# Patient Record
Sex: MALE
[De-identification: names, ages, dates, MRNs, and addresses within clinical notes are randomized per-mention and may not be internally consistent; named-entity substitution may affect disease eponyms.]

---

## 2017-04-30 ENCOUNTER — HOSPITAL (OUTPATIENT)
Dept: OTHER | Age: 52
End: 2017-04-30
Attending: INTERNAL MEDICINE

## 2022-03-18 ENCOUNTER — OFFICE VISIT (OUTPATIENT)
Dept: SURGERY | Facility: CLINIC | Age: 57
End: 2022-03-18
Payer: MEDICAID

## 2022-03-18 VITALS
BODY MASS INDEX: 30.05 KG/M2 | WEIGHT: 176 LBS | SYSTOLIC BLOOD PRESSURE: 120 MMHG | DIASTOLIC BLOOD PRESSURE: 75 MMHG | HEART RATE: 71 BPM | HEIGHT: 64 IN

## 2022-03-18 DIAGNOSIS — Z12.5 PROSTATE CANCER SCREENING: ICD-10-CM

## 2022-03-18 DIAGNOSIS — N48.6 PEYRONIE'S DISEASE: Primary | ICD-10-CM

## 2022-03-18 DIAGNOSIS — R35.1 NOCTURIA: ICD-10-CM

## 2022-03-18 PROCEDURE — 99244 OFF/OP CNSLTJ NEW/EST MOD 40: CPT | Performed by: UROLOGY

## 2022-03-18 PROCEDURE — 3078F DIAST BP <80 MM HG: CPT | Performed by: UROLOGY

## 2022-03-18 PROCEDURE — 3008F BODY MASS INDEX DOCD: CPT | Performed by: UROLOGY

## 2022-03-18 PROCEDURE — 3074F SYST BP LT 130 MM HG: CPT | Performed by: UROLOGY

## 2022-03-18 RX ORDER — GABAPENTIN 300 MG/1
CAPSULE ORAL
COMMUNITY

## 2022-03-18 RX ORDER — TRAMADOL HYDROCHLORIDE 50 MG/1
TABLET ORAL
COMMUNITY

## 2022-03-18 RX ORDER — PENTOXIFYLLINE 400 MG/1
400 TABLET, EXTENDED RELEASE ORAL
Qty: 270 TABLET | Refills: 1 | Status: SHIPPED | OUTPATIENT
Start: 2022-03-18 | End: 2022-09-14

## 2022-03-18 NOTE — PATIENT INSTRUCTIONS
Bharti Clement M.D.    . 1.  I recommend blood draw for PSA prostate cancer screening and urinalysis urine test; we will notify you of the results    2. For your Peyronie's disease, \"off label\" pentoxifylline 400 mg 3 times daily with food for the next few months/several months. 3.  If you wish to obtain second opinion, consider Dr. Ariel Burrell M.D., nationally regarded as one of the experts in the treatment of Peyronie's disease; he has had [facility] in [facility]. You would have to check if he accepts your health plan. 4.  You could also consider purchasing a \"penis extender device\"; you can google this. These devices are felt to be more effective in the first 9 months when Peyronie's disease is just starting. 5.  Another treatment option FDA approved which does have a potential complication of penile rupture would be 2-3 times weekly needle injection into the Peyronie's plaque of the penis a medication called Xiaflex --- I do not administer this medication. 6.  Return visit in 4 months to reevaluate.

## 2022-03-28 ENCOUNTER — LAB ENCOUNTER (OUTPATIENT)
Dept: LAB | Facility: HOSPITAL | Age: 57
End: 2022-03-28
Attending: UROLOGY
Payer: MEDICAID

## 2022-03-28 DIAGNOSIS — R35.1 NOCTURIA: ICD-10-CM

## 2022-03-28 DIAGNOSIS — Z12.5 PROSTATE CANCER SCREENING: ICD-10-CM

## 2022-03-28 LAB
BILIRUB UR QL: NEGATIVE
CLARITY UR: CLEAR
COLOR UR: YELLOW
COMPLEXED PSA SERPL-MCNC: 2.55 NG/ML (ref ?–4)
GLUCOSE UR-MCNC: NEGATIVE MG/DL
HGB UR QL STRIP.AUTO: NEGATIVE
KETONES UR-MCNC: NEGATIVE MG/DL
LEUKOCYTE ESTERASE UR QL STRIP.AUTO: NEGATIVE
NITRITE UR QL STRIP.AUTO: NEGATIVE
PH UR: 6 [PH] (ref 5–8)
SP GR UR STRIP: 1.01 (ref 1–1.03)
UROBILINOGEN UR STRIP-ACNC: <2
VIT C UR-MCNC: NEGATIVE MG/DL

## 2022-03-28 PROCEDURE — 81003 URINALYSIS AUTO W/O SCOPE: CPT

## 2022-03-28 PROCEDURE — 36415 COLL VENOUS BLD VENIPUNCTURE: CPT

## 2022-08-30 ENCOUNTER — OFFICE VISIT (OUTPATIENT)
Dept: SURGERY | Facility: CLINIC | Age: 57
End: 2022-08-30
Payer: MEDICAID

## 2022-08-30 DIAGNOSIS — N48.6 PEYRONIE'S DISEASE: Primary | ICD-10-CM

## 2022-08-30 DIAGNOSIS — N52.02 CORPORO-VENOUS OCCLUSIVE ERECTILE DYSFUNCTION: ICD-10-CM

## 2022-08-30 DIAGNOSIS — Z12.5 PROSTATE CANCER SCREENING: ICD-10-CM

## 2022-08-30 DIAGNOSIS — N52.9 ERECTILE DYSFUNCTION, UNSPECIFIED ERECTILE DYSFUNCTION TYPE: ICD-10-CM

## 2022-08-30 DIAGNOSIS — R35.1 NOCTURIA: ICD-10-CM

## 2022-08-30 PROCEDURE — 99214 OFFICE O/P EST MOD 30 MIN: CPT | Performed by: UROLOGY

## 2022-08-30 RX ORDER — TADALAFIL 5 MG/1
5 TABLET ORAL
Qty: 30 TABLET | Refills: 11 | Status: SHIPPED | OUTPATIENT
Start: 2022-08-30

## 2022-08-30 NOTE — PATIENT INSTRUCTIONS
Garrick Becerra M.D.    1.  You have both Peyronie's disease as well as decreased rigidity of erection. 2.  For now you have decided to observe the Peyronie's disease    3. For erectile dysfunction, please take tadalafil 5 mg; you can take it either daily or else you could try to take 1 tablet at least 1 hour before intercourse. If you end up taking it not daily but rather only 1 tablet 1 hour before intercourse, and if that is not strong enough to improve rigidity of your erection, then next time take 2 tablets for a total of 10 mg. On the other hand, if you take 5 mg every day, that should be enough to provide you with good rigidity when you do have intercourse. 4.   You will be due for a PSA prostate cancer screening blood test on March 29, 2023.

## 2022-10-06 ENCOUNTER — OFFICE VISIT (OUTPATIENT)
Dept: OTOLARYNGOLOGY | Facility: CLINIC | Age: 57
End: 2022-10-06
Payer: MEDICAID

## 2022-10-06 DIAGNOSIS — G47.33 OBSTRUCTIVE SLEEP APNEA: ICD-10-CM

## 2022-10-06 DIAGNOSIS — H92.02 LEFT EAR PAIN: Primary | ICD-10-CM

## 2022-10-06 PROCEDURE — 99203 OFFICE O/P NEW LOW 30 MIN: CPT | Performed by: OTOLARYNGOLOGY

## 2022-10-17 ENCOUNTER — TELEPHONE (OUTPATIENT)
Dept: OTOLARYNGOLOGY | Facility: CLINIC | Age: 57
End: 2022-10-17

## 2022-10-17 NOTE — TELEPHONE ENCOUNTER
Fax received from [address] [place] [address] denied  Procedure [id number]    Fax left at [facility] blue folder

## 2022-10-19 NOTE — TELEPHONE ENCOUNTER
Reason for denial was a test similar to the one request was already approved. Spoke with patient he has two letters at home. One approve and one denied. He will send a copy to us to review.        Tracking #: [id number]

## 2022-11-09 ENCOUNTER — OFFICE VISIT (OUTPATIENT)
Dept: SLEEP CENTER | Age: 57
End: 2022-11-09
Attending: INTERNAL MEDICINE
Payer: MEDICAID

## 2022-11-09 DIAGNOSIS — G47.33 OBSTRUCTIVE SLEEP APNEA: ICD-10-CM

## 2022-11-09 PROCEDURE — 95806 SLEEP STUDY UNATT&RESP EFFT: CPT

## 2022-12-01 ENCOUNTER — OFFICE VISIT (OUTPATIENT)
Dept: OTOLARYNGOLOGY | Facility: CLINIC | Age: 57
End: 2022-12-01
Payer: MEDICAID

## 2022-12-01 DIAGNOSIS — G47.33 OBSTRUCTIVE SLEEP APNEA: Primary | ICD-10-CM

## 2022-12-01 PROCEDURE — 99213 OFFICE O/P EST LOW 20 MIN: CPT | Performed by: OTOLARYNGOLOGY

## 2022-12-01 RX ORDER — IBUPROFEN 200 MG
TABLET ORAL EVERY 6 HOURS
COMMUNITY

## 2022-12-01 RX ORDER — TRAMADOL HYDROCHLORIDE 50 MG/1
TABLET ORAL
COMMUNITY
End: 2022-12-01

## 2022-12-01 RX ORDER — IBUPROFEN 600 MG/1
TABLET ORAL
COMMUNITY
Start: 2022-11-17

## 2022-12-01 RX ORDER — AZITHROMYCIN 250 MG/1
TABLET, FILM COATED ORAL
COMMUNITY
Start: 2022-11-17

## 2022-12-01 RX ORDER — GABAPENTIN 300 MG/1
CAPSULE ORAL
COMMUNITY
End: 2022-12-01

## 2023-02-21 ENCOUNTER — OFFICE VISIT (OUTPATIENT)
Dept: OTOLARYNGOLOGY | Facility: CLINIC | Age: 58
End: 2023-02-21

## 2023-02-21 DIAGNOSIS — G47.33 OBSTRUCTIVE SLEEP APNEA: Primary | ICD-10-CM

## 2023-02-21 PROCEDURE — 99213 OFFICE O/P EST LOW 20 MIN: CPT | Performed by: OTOLARYNGOLOGY

## 2023-05-26 ENCOUNTER — TELEPHONE (OUTPATIENT)
Dept: OTOLARYNGOLOGY | Facility: CLINIC | Age: 58
End: 2023-05-26

## 2023-05-26 NOTE — TELEPHONE ENCOUNTER
Asking for home  sleep study results and last OV note to be sent to [facility] recommended - he does not have info

## 2023-07-10 ENCOUNTER — TELEPHONE (OUTPATIENT)
Dept: OTOLARYNGOLOGY | Facility: CLINIC | Age: 58
End: 2023-07-10

## 2023-07-10 NOTE — TELEPHONE ENCOUNTER
Per pt [facility] canceled his sleep pap order stating they never received the last office visit notes.   Per pt please fax to [phone]

## 2023-07-12 NOTE — TELEPHONE ENCOUNTER
Spoke to Deb at [facility], she requested the F2F notes from [facility] on 2/21/23. Notes faxed as requested. Call patient, informed him of above. Once [facility] received the OV notes he will receive his supplies. Patient understanding.

## 2023-07-18 ENCOUNTER — TELEPHONE (OUTPATIENT)
Dept: OTOLARYNGOLOGY | Facility: CLINIC | Age: 58
End: 2023-07-18

## 2023-12-08 ENCOUNTER — TELEPHONE (OUTPATIENT)
Dept: OTOLARYNGOLOGY | Facility: CLINIC | Age: 58
End: 2023-12-08

## 2023-12-08 ENCOUNTER — OFFICE VISIT (OUTPATIENT)
Dept: OTOLARYNGOLOGY | Facility: CLINIC | Age: 58
End: 2023-12-08

## 2023-12-08 DIAGNOSIS — G47.33 OBSTRUCTIVE SLEEP APNEA: Primary | ICD-10-CM

## 2023-12-08 DIAGNOSIS — J34.2 DEVIATED NASAL SEPTUM: ICD-10-CM

## 2023-12-08 PROCEDURE — 99213 OFFICE O/P EST LOW 20 MIN: CPT | Performed by: OTOLARYNGOLOGY

## 2023-12-08 NOTE — PROGRESS NOTES
Shayne Matthews is a 66year old male. Chief Complaint   Patient presents with    Nose Problem     Patient is here to discuss surgical intervention on diviated septum. Sleep Apnea     Patient is here to to discuss sleep apnea. HISTORY OF PRESENT ILLNESS  He presents with a sensation of pain that radiates from his ear to his temple on the left. Wife is present and states that he grinds his teeth he states he is never noticed. Does complain of popping of the left ear as well. Wife also notes that he snores and obstructs. He does admit to being tired throughout the day and having symptoms of obstructive sleep apnea waking up gasping for air during the night. Some difficulty breathing especially when sleeping. Has to sleep in certain positions to be able to breathe. 12/1/22 history of sleep apnea. He has used somebody else's CPAP machine that he adjusted to allow him to breathe better throughout the night. States that over time things worsen. 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  Repeat sleep study performed at home demonstrated mild obstructive sleep apnea with desaturations down to 73%. No other issues. Was able to tolerate nasal pillows in the past with his CPAP     2/21/23 he has been using APAP for the last month or 2 and states that he gets going pretty well. Uses his machine daily for several hours and never takes the nasal pillows off. States that sometimes he awakens due to some leakage from the nasal pillows. Previously noted to have a deviated septum to the left. Overall doing quite well does complain of some dryness in his nose when he awakens in the mornings. 12/8/23 history of mild JOEL that becomes slightly worse in the supine position. Lowest desaturation 73%.   Using CPAP with nasal pillows with states that he is having some issues with the pillows which keep him from sleeping well throughout the night. Wishes to discuss possible management of a deviated septum noted on the previous examination. No other signs, symptoms or complaint      Social History     Socioeconomic History    Marital status: Unknown   Tobacco Use    Smoking status: Never    Smokeless tobacco: Never   Substance and Sexual Activity    Alcohol use: Never    Drug use: Never       History reviewed. No pertinent family history. History reviewed. No pertinent past medical history. History reviewed. No pertinent surgical history. REVIEW OF SYSTEMS    System Neg/Pos Details   Constitutional Negative Fatigue, fever and weight loss. ENMT Negative Drooling. Eyes Negative Blurred vision and vision changes. Respiratory Negative Dyspnea and wheezing. Cardio Negative Chest pain, irregular heartbeat/palpitations and syncope. GI Negative Abdominal pain and diarrhea. Endocrine Negative Cold intolerance and heat intolerance. Neuro Negative Tremors. Psych Negative Anxiety and depression. Integumentary Negative Frequent skin infections, pigment change and rash. Hema/Lymph Negative Easy bleeding and easy bruising. PHYSICAL EXAM    There were no vitals taken for this visit. Constitutional Normal Overall appearance - Normal.   Psychiatric Normal Orientation - Oriented to time, place, person & situation. Appropriate mood and affect. Neck Exam Normal Inspection - Normal. Palpation - Normal. Parotid gland - Normal. Thyroid gland - Normal.   Eyes Normal Conjunctiva - Right: Normal, Left: Normal. Pupil - Right: Normal, Left: Normal. Fundus - Right: Normal, Left: Normal.   Neurological Normal Memory - Normal. Cranial nerves - Cranial nerves II through XII grossly intact.    Head/Face Normal Facial features - Normal. Eyebrows - Normal. Skull - Normal.        Nasopharynx Normal External nose - Normal. Lips/teeth/gums - Normal. Tonsils - Normal. Oropharynx - Normal.   Ears Normal Inspection - Right: Normal, Left: Normal. Canal - Right: Normal, Left: Normal. TM - Right: Normal, Left: Normal.   Skin Normal Inspection - Normal.        Lymph Detail Normal Submental. Submandibular. Anterior cervical. Posterior cervical. Supraclavicular. Nose/Mouth/Throat Normal External nose - Normal. Lips/teeth/gums - Normal. Tonsils - Normal. Oropharynx - Normal.   Nose/Mouth/Throat Normal Nares - Right: Normal Left: Normal. Septum -deviated to the left 75% turbinates - Right: Mild hypertrophy left: Moderate hypertrophy       Current Outpatient Medications:     ibuprofen 600 MG Oral Tab, , Disp: , Rfl:     azithromycin 250 MG Oral Tab, , Disp: , Rfl:     ibuprofen 200 MG Oral Tab, every 6 (six) hours. (Patient not taking: Reported on 12/1/2022), Disp: , Rfl:     tadalafil (CIALIS) 5 MG Oral Tab, Take 1 tablet (5 mg total) by mouth daily as needed (for voiding dysfunction from enlarged prostate). (Patient not taking: Reported on 12/1/2022), Disp: 30 tablet, Rfl: 11    gabapentin 300 MG Oral Cap, , Disp: , Rfl:     traMADol 50 MG Oral Tab, , Disp: , Rfl:   ASSESSMENT AND PLAN    1. Obstructive sleep apnea    2. Deviated nasal septum  Deviated to the left. Having a hard time with his current nasal pillows and CPAP machine. No downloadable material for me to evaluate at this time. We did discuss septoplasty and turbinate reduction as he does complain of chronic nasal obstruction refractory to use of medication in the past.  I have asked him to consider this option and let me know if she wishes to proceed. We specifically discussed the risks and benefits particularly postoperative pain, bleeding as well as anesthesia used. He accepts these risks states understanding of all me know if he wishes to proceed. This note was prepared using [email or web address] voice recognition dictation software. As a result errors may occur. When identified these errors have been corrected.  While every attempt is made to correct errors during dictation discrepancies may still exist    Thomas Camejo MD    12/8/2023    12:36 PM

## 2023-12-08 NOTE — TELEPHONE ENCOUNTER
Pt was seen in the office today with Dr Bogdan Sanchez  for cpap and requesting to send progress notes to E  Thank you

## 2023-12-08 NOTE — TELEPHONE ENCOUNTER
Fax sent to [facility]. Called patient to let him know fax was sent over. Patient aware and understood. Will call with any other questions.

## 2024-03-29 ENCOUNTER — TELEPHONE (OUTPATIENT)
Dept: OTOLARYNGOLOGY | Facility: CLINIC | Age: 59
End: 2024-03-29

## 2024-03-29 NOTE — TELEPHONE ENCOUNTER
Danya, [facility] calling to follow up on order fax on 3/27 for patients cpap supplies. Please call at [phone],thanks.

## 2024-03-29 NOTE — TELEPHONE ENCOUNTER
Contacted Ashely at [facility] and per Ashely all cpap supplies needed were faxed to office,  Fax placed on dr. Mancia's desk for him to sign, then will fax back to [phone].    Dr. Mancia, please sign faxed order and return to RN,  Placed on md desk.

## 2025-02-14 ENCOUNTER — OFFICE VISIT (OUTPATIENT)
Dept: OTOLARYNGOLOGY | Facility: CLINIC | Age: 60
End: 2025-02-14

## 2025-02-14 VITALS — WEIGHT: 189 LBS | BODY MASS INDEX: 32 KG/M2

## 2025-02-14 DIAGNOSIS — J34.2 DEVIATED NASAL SEPTUM: Primary | ICD-10-CM

## 2025-02-14 DIAGNOSIS — G47.33 OBSTRUCTIVE SLEEP APNEA: ICD-10-CM

## 2025-02-14 PROCEDURE — 99213 OFFICE O/P EST LOW 20 MIN: CPT | Performed by: OTOLARYNGOLOGY

## 2025-02-14 RX ORDER — MONTELUKAST SODIUM 10 MG/1
10 TABLET ORAL NIGHTLY
Qty: 30 TABLET | Refills: 3 | Status: SHIPPED | OUTPATIENT
Start: 2025-02-14

## 2025-02-14 RX ORDER — AZELASTINE 1 MG/ML
2 SPRAY, METERED NASAL 2 TIMES DAILY
Qty: 30 ML | Refills: 3 | Status: SHIPPED | OUTPATIENT
Start: 2025-02-14

## 2025-02-14 RX ORDER — LORATADINE 10 MG/1
10 TABLET ORAL DAILY
Qty: 30 TABLET | Refills: 3 | Status: SHIPPED | OUTPATIENT
Start: 2025-02-14

## 2025-02-14 NOTE — PROGRESS NOTES
Nawaf Engel is a 59 year old male.    Chief Complaint   Patient presents with    Sinus Problem     Patient is here due to JOEL  and congestion in nose        HISTORY OF PRESENT ILLNESS  He presents with a sensation of pain that radiates from his ear to his temple on the left.  Wife is present and states that he grinds his teeth he states he is never noticed.  Does complain of popping of the left ear as well.  Wife also notes that he snores and obstructs.  He does admit to being tired throughout the day and having symptoms of obstructive sleep apnea waking up gasping for air during the night.  Some difficulty breathing especially when sleeping.  Has to sleep in certain positions to be able to breathe.     12/1/22 history of sleep apnea.  He has used somebody else's CPAP machine that he adjusted to allow him to breathe better throughout the night.  States that over time things worsen.  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  Repeat sleep study performed at home demonstrated mild obstructive sleep apnea with desaturations down to 73%.  No other issues.  Was able to tolerate nasal pillows in the past with his CPAP     2/21/23 he has been using APAP for the last month or 2 and states that he gets going pretty well.  Uses his machine daily for several hours and never takes the nasal pillows off.  States that sometimes he awakens due to some leakage from the nasal pillows.  Previously noted to have a deviated septum to the left.  Overall doing quite well does complain of some dryness in his nose when he awakens in the mornings.     12/8/23 history of mild JOEL that becomes slightly worse in the supine position.  Lowest desaturation 73%.  Using CPAP with nasal pillows with states that he is having some issues with the pillows which keep him from sleeping well throughout the night.  Wishes to discuss possible  management of a deviated septum noted on the previous examination.  No other signs, symptoms or complaint     2/14/25 presents today with issues regarding his nose.  He does have a history of mild JOEL that becomes worse in the supine position.  Desaturates down to 73%.  He has used CPAP with nasal pillows in the past and states that he is having significant difficulty tolerating this machine at this point.  Complains of chronic nasal obstructive issues.    Social History     Socioeconomic History    Marital status: Unknown   Tobacco Use    Smoking status: Never    Smokeless tobacco: Never   Vaping Use    Vaping status: Never Used   Substance and Sexual Activity    Alcohol use: Never    Drug use: Never       History reviewed. No pertinent family history.    History reviewed. No pertinent past medical history.    History reviewed. No pertinent surgical history.      REVIEW OF SYSTEMS    System Neg/Pos Details   Constitutional Negative Fatigue, fever and weight loss.   ENMT Negative Drooling.   Eyes Negative Blurred vision and vision changes.   Respiratory Negative Dyspnea and wheezing.   Cardio Negative Chest pain, irregular heartbeat/palpitations and syncope.   GI Negative Abdominal pain and diarrhea.   Endocrine Negative Cold intolerance and heat intolerance.   Neuro Negative Tremors.   Psych Negative Anxiety and depression.   Integumentary Negative Frequent skin infections, pigment change and rash.   Hema/Lymph Negative Easy bleeding and easy bruising.           PHYSICAL EXAM    Wt 189 lb (85.7 kg)   BMI 32.44 kg/m²        Constitutional Normal Overall appearance - Normal.   Psychiatric Normal Orientation - Oriented to time, place, person & situation. Appropriate mood and affect.   Neck Exam Normal Inspection - Normal. Palpation - Normal. Parotid gland - Normal. Thyroid gland - Normal.   Eyes Normal Conjunctiva - Right: Normal, Left: Normal. Pupil - Right: Normal, Left: Normal. Fundus - Right: Normal, Left:  Normal.   Neurological Normal Memory - Normal. Cranial nerves - Cranial nerves II through XII grossly intact.   Head/Face Normal Facial features - Normal. Eyebrows - Normal. Skull - Normal.        Nasopharynx Normal External nose - Normal. Lips/teeth/gums - Normal. Tonsils - Normal. Oropharynx - Normal.   Ears Normal Inspection - Right: Normal, Left: Normal. Canal - Right: Normal, Left: Normal. TM - Right: Normal, Left: Normal.   Skin Normal Inspection - Normal.        Lymph Detail Normal Submental. Submandibular. Anterior cervical. Posterior cervical. Supraclavicular.        Nose/Mouth/Throat Normal External nose - Normal. Lips/teeth/gums - Normal. Tonsils - Normal. Oropharynx - Normal.  Large tongue base obstructing the oropharyngeal airway   Nose/Mouth/Throat Normal Nares - Right: Normal Left: Normal. Septum -deviated turbinates - Right: Moderate hypertrophy left: Moderate hypertrophy       Current Outpatient Medications:     montelukast 10 MG Oral Tab, Take 1 tablet (10 mg total) by mouth nightly., Disp: 30 tablet, Rfl: 3    loratadine 10 MG Oral Tab, Take 1 tablet (10 mg total) by mouth daily., Disp: 30 tablet, Rfl: 3    azelastine 0.1 % Nasal Solution, 2 sprays by Nasal route 2 (two) times daily., Disp: 30 mL, Rfl: 3    ibuprofen 600 MG Oral Tab, , Disp: , Rfl:     azithromycin 250 MG Oral Tab, , Disp: , Rfl:     ibuprofen 200 MG Oral Tab, every 6 (six) hours., Disp: , Rfl:     tadalafil (CIALIS) 5 MG Oral Tab, Take 1 tablet (5 mg total) by mouth daily as needed (for voiding dysfunction from enlarged prostate)., Disp: 30 tablet, Rfl: 11    gabapentin 300 MG Oral Cap, , Disp: , Rfl:     traMADol 50 MG Oral Tab, , Disp: , Rfl:   ASSESSMENT AND PLAN    1. Deviated nasal septum    2. Obstructive sleep apnea  He does have a history of mild JOEL that worsens when he is supine.  Does have a deviated septum and has some difficulty tolerating his CPAP nasal pillows.  I have recommended that he trial Singulair loratadine  and Astelin nasal spray and return to see me in 1 month at that time we will discuss whether surgery would be helpful to improve his tolerance of his nasal CPAP.  He does understand the surgery may not necessarily cure him of any apnea issues.  He does have a large tongue base obstructing the oropharyngeal airway on exam.  I do suspect that this plays a large role in his obstructive sleep apnea issues.        This note was prepared using [employer] voice recognition dictation software. As a result errors may occur. When identified these errors have been corrected. While every attempt is made to correct errors during dictation discrepancies may still exist    Thomas Mancia MD    2/14/2025    8:00 AM

## 2025-04-11 ENCOUNTER — OFFICE VISIT (OUTPATIENT)
Dept: OTOLARYNGOLOGY | Facility: CLINIC | Age: 60
End: 2025-04-11

## 2025-04-11 DIAGNOSIS — R09.82 POSTNASAL DISCHARGE: ICD-10-CM

## 2025-04-11 DIAGNOSIS — J34.2 DEVIATED NASAL SEPTUM: Primary | ICD-10-CM

## 2025-04-11 PROCEDURE — 99213 OFFICE O/P EST LOW 20 MIN: CPT | Performed by: OTOLARYNGOLOGY

## 2025-04-11 NOTE — PROGRESS NOTES
Nawaf Engel is a 59 year old male.    Chief Complaint   Patient presents with    Follow - Up     Patient is here due to deviated nasal septum follow up.        HISTORY OF PRESENT ILLNESS  He presents with a sensation of pain that radiates from his ear to his temple on the left.  Wife is present and states that he grinds his teeth he states he is never noticed.  Does complain of popping of the left ear as well.  Wife also notes that he snores and obstructs.  He does admit to being tired throughout the day and having symptoms of obstructive sleep apnea waking up gasping for air during the night.  Some difficulty breathing especially when sleeping.  Has to sleep in certain positions to be able to breathe.     12/1/22 history of sleep apnea.  He has used somebody else's CPAP machine that he adjusted to allow him to breathe better throughout the night.  States that over time things worsen.  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  Repeat sleep study performed at home demonstrated mild obstructive sleep apnea with desaturations down to 73%.  No other issues.  Was able to tolerate nasal pillows in the past with his CPAP     2/21/23 he has been using APAP for the last month or 2 and states that he gets going pretty well.  Uses his machine daily for several hours and never takes the nasal pillows off.  States that sometimes he awakens due to some leakage from the nasal pillows.  Previously noted to have a deviated septum to the left.  Overall doing quite well does complain of some dryness in his nose when he awakens in the mornings.     12/8/23 history of mild JOEL that becomes slightly worse in the supine position.  Lowest desaturation 73%.  Using CPAP with nasal pillows with states that he is having some issues with the pillows which keep him from sleeping well throughout the night.  Wishes to discuss possible  management of a deviated septum noted on the previous examination.  No other signs, symptoms or complaint     2/14/25 presents today with issues regarding his nose.  He does have a history of mild JOEL that becomes worse in the supine position.  Desaturates down to 73%.  He has used CPAP with nasal pillows in the past and states that he is having significant difficulty tolerating this machine at this point.  Complains of chronic nasal obstructive issues.     4/11/25 does pretty well simply using Claritin and the nasal spray.  Astelin seems to help open them up when he goes to bed.  He does have JOEL which is mild but becomes worse in the supine position.  He does desaturate down to 70%.  We did discuss at today's visit the value of addressing his previously noted septal deviation with respect to just breathing in general and his CPAP use.      Social Hx on file[1]    Family History[2]    Past Medical History[3]    Past Surgical History[4]      REVIEW OF SYSTEMS    System Neg/Pos Details   Constitutional Negative Fatigue, fever and weight loss.   ENMT Negative Drooling.   Eyes Negative Blurred vision and vision changes.   Respiratory Negative Dyspnea and wheezing.   Cardio Negative Chest pain, irregular heartbeat/palpitations and syncope.   GI Negative Abdominal pain and diarrhea.   Endocrine Negative Cold intolerance and heat intolerance.   Neuro Negative Tremors.   Psych Negative Anxiety and depression.   Integumentary Negative Frequent skin infections, pigment change and rash.   Hema/Lymph Negative Easy bleeding and easy bruising.           PHYSICAL EXAM    There were no vitals taken for this visit.       Constitutional Normal Overall appearance - Normal.   Psychiatric Normal Orientation - Oriented to time, place, person & situation. Appropriate mood and affect.   Neck Exam Normal Inspection - Normal. Palpation - Normal. Parotid gland - Normal. Thyroid gland - Normal.   Eyes Normal Conjunctiva - Right: Normal, Left:  Normal. Pupil - Right: Normal, Left: Normal. Fundus - Right: Normal, Left: Normal.   Neurological Normal Memory - Normal. Cranial nerves - Cranial nerves II through XII grossly intact.   Head/Face Normal Facial features - Normal. Eyebrows - Normal. Skull - Normal.        Nasopharynx Normal External nose - Normal. Lips/teeth/gums - Normal. Tonsils - Normal. Oropharynx - Normal.   Ears Normal Inspection - Right: Normal, Left: Normal. Canal - Right: Normal, Left: Normal. TM - Right: Normal, Left: Normal.   Skin Normal Inspection - Normal.        Lymph Detail Normal Submental. Submandibular. Anterior cervical. Posterior cervical. Supraclavicular.        Nose/Mouth/Throat Normal External nose - Normal. Lips/teeth/gums - Normal. Tonsils - Normal. Oropharynx - Normal.   Nose/Mouth/Throat Normal Nares - Right: Normal Left: Normal. Septum -deviated to the left 70% turbinates - Right: Moderate hypertrophy left: Mild hypertrophy     Medications - Current[5]  ASSESSMENT AND PLAN    1. Deviated nasal septum      2. Postnasal discharge  Appears to get by with the use of intermittent use of Claritin and the nasal spray.  Does have a deviated septum to the left.  We did discuss surgical management.  He is about 75% sure that he will probably follow through with surgery.  I have asked him to simply return to see me at his convenience if he wishes to discuss surgical management of his chronic nasal obstructive issues.  Alternatively he will return to see me in 1 year for refills on his medications.  We did discuss the fact that septoplasty would not necessarily cure him of his obstructive sleep apnea issues.  He states understanding.        This note was prepared using [employer] voice recognition dictation software. As a result errors may occur. When identified these errors have been corrected. While every attempt is made to correct errors during dictation discrepancies may still exist    Thomas Mancia MD    4/11/2025    7:52  AM         [1]   Social History  Socioeconomic History    Marital status: Unknown   Tobacco Use    Smoking status: Never    Smokeless tobacco: Never   Vaping Use    Vaping status: Never Used   Substance and Sexual Activity    Alcohol use: Never    Drug use: Never   [2] History reviewed. No pertinent family history.  [3] History reviewed. No pertinent past medical history.  [4] History reviewed. No pertinent surgical history.  [5]   Current Outpatient Medications:     montelukast 10 MG Oral Tab, Take 1 tablet (10 mg total) by mouth nightly., Disp: 30 tablet, Rfl: 3    loratadine 10 MG Oral Tab, Take 1 tablet (10 mg total) by mouth daily., Disp: 30 tablet, Rfl: 3    azelastine 0.1 % Nasal Solution, 2 sprays by Nasal route 2 (two) times daily., Disp: 30 mL, Rfl: 3    ibuprofen 600 MG Oral Tab, , Disp: , Rfl:     ibuprofen 200 MG Oral Tab, every 6 (six) hours., Disp: , Rfl:     tadalafil (CIALIS) 5 MG Oral Tab, Take 1 tablet (5 mg total) by mouth daily as needed (for voiding dysfunction from enlarged prostate)., Disp: 30 tablet, Rfl: 11    gabapentin 300 MG Oral Cap, , Disp: , Rfl:     traMADol 50 MG Oral Tab, , Disp: , Rfl:     azithromycin 250 MG Oral Tab, , Disp: , Rfl:

## 2025-07-17 ENCOUNTER — TELEPHONE (OUTPATIENT)
Dept: OTOLARYNGOLOGY | Facility: CLINIC | Age: 60
End: 2025-07-17

## (undated) NOTE — LETTER
No referring provider defined for this encounter. 03/18/22        Patient: Tino Guzman   YOB: 1965   Date of Visit: 3/18/2022       Dear  Dr. Lelia Estrada,      I evaluated Tino Guzman, whom you sent to our practice for a consult opinion. Please find my assessment and plan below. Sincerely,     Mckayla Oneal MD     [address] [place] [address], [facility] [address], [address]      ASSESSMENT/PLAN:      Peyronie's disease  (primary encounter diagnosis)  Nocturia  Prostate cancer screening      (N48.6) Peyronie's disease  (primary encounter diagnosis)  Problem started 3 years ago. We discussed starting \"off label\" pentoxifylline vs. Penis extender device (Fastsize) vs. Pixie Serum. I explained to the patient that I do not administer Xiaflex due to the possibility of rupturing the penis as a complication. I fully explained to patient the benefits, risks, and alternatives to this treatment option and I answered patient's questions; patient decides against Xialfex as per our office's records, his insurance would not cover the cost of Xiaflex injections. and dedcides to start pentoxifylline 400 mg; patient would like to consider purchasing a penis extender device. I recommended to the patient that if he would like a second opinion, he could consult with Dr. Av Cornejo M.D. of [facility]. Patient will follow up with me in 4 months. I am sending a prescription for \"off label\" pentoxifylline to his pharmacy.     (R35.1) Nocturia  Patient has current AUA score of 4, mild voiding dysfunction category; nocturia 1x; We discussed undergoing UA vs. observation. I fully explained to patient the benefits, risks, and alternatives to this treatment option and I answered patient's questions; patient decides to undergo UA soon.       (Z12.5) Prostate cancer screening  On ELIZABETH today, prostate normal, no palpable nodules or indurations. We discussed undergoing PSA screening vs. observation.  I fully explained to patient the benefits, risks, and alternatives to this treatment option and I answered patient's questions; patient decides to undergo PSA screening soon. I inform patient that I am planning on retiring on 08/31/2022; I explain that I have detailed notes about their care and they can follow up with a Urology colleague. RECOMMENDATIONS AND TREATMENT PLAN        1. I recommend blood draw for PSA prostate cancer screening and urinalysis urine test; we will notify you of the results    2. For your Peyronie's disease, \"off label\" pentoxifylline 400 mg 3 times daily with food for the next few months/several months. 3.  If you wish to obtain second opinion, consider Dr. Hal Hernadez M.D., nationally regarded as one of the experts in the treatment of Peyronie's disease; he has had [facility] in [facility]. You would have to check if he accepts your health plan. 4.  You could also consider purchasing a \"penis extender device\"; you can google this. These devices are felt to be more effective in the first 9 months when Peyronie's disease is just starting. 5.  Another treatment option FDA approved which does have a potential complication of penile rupture would be 2-3 times weekly needle injection into the Peyronie's plaque of the penis a medication called Xiaflex --- I do not administer this medication. 6.  Return visit in 4 months to reevaluate. Thank you for sending this  patient to see me. My intention is to keep you informed of  all significant urological  developments. Dr. Eriberto Saravia M.D.        Document electronically generated by:  Da Tejeda MD